# Patient Record
Sex: FEMALE | Race: BLACK OR AFRICAN AMERICAN | Employment: UNEMPLOYED | URBAN - METROPOLITAN AREA
[De-identification: names, ages, dates, MRNs, and addresses within clinical notes are randomized per-mention and may not be internally consistent; named-entity substitution may affect disease eponyms.]

---

## 2019-04-21 NOTE — PROGRESS NOTES
Subjective: Sandy Lebron is a 27 y.o. female who presents today to become established. She is transferring from her pediatrician. No new concerns today. 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 Prior PCP - Dr. Vázquez. Day. pediatrician Active Medical Problems: 
- women's clinic - [address] - hormone implant. Nexplanon.    
-hx of von willibrands disorder - mother states that she was diagnosed as a child. This is part of the reason why she is on the birth control to decrease her menstrual blood loss. Health Care Maintenance 
-pap test - has never had one 
-immunizations - have requested her immunization record from her pediatrician. Patient Active Problem List  
Diagnosis Code  Von Willebrand disease ([facility] D68.0 Current Outpatient Medications Medication Sig Dispense Refill  ferrous sulfate 325 mg (65 mg iron) tablet Take  by mouth Daily (before breakfast). Review of Systems A comprehensive review of systems was negative except for that written in the HPI. Objective:  
 
Visit Vitals /76 (BP 1 Location: Left arm, BP Patient Position: Sitting) Pulse 89 Temp 98.8 °F (37.1 °C) (Oral) Resp 18 Ht 5' 5\" (1.651 m) Wt 192 lb 6.4 oz (87.3 kg) SpO2 99% BMI 32.02 kg/m² General appearance: alert, cooperative, no distress, appears stated age Head: Normocephalic, without obvious abnormality, atraumatic Neck: supple, symmetrical, trachea midline, no adenopathy, no carotid bruit and no JVD Lungs: clear to auscultation bilaterally Heart: regular rate and rhythm, S1, S2 normal, no murmur, click, rub or gallop Abdomen: soft, non-tender. Bowel sounds normal. No masses,  no organomegaly Extremities: extremities normal, atraumatic, no cyanosis or edema Pulses: 2+ and symmetric Assessment/Plan: ICD-10-CM ICD-9-CM 1. Encounter to establish care with new doctor Z76.89 V65.8 2. Von Willebrand disease ([facility] [address] D68.0 286.4 Plan: 
-Medical release forms have been signed for her medical records from her pediatrician. Follow-up Disposition:  
 
Follow up yearly Return if symptoms worsen or fail to improve. Advised patient to call back or return to office if symptoms worsen/change/persist.  
 
Discussed expected course/resolution/complications of diagnosis in detail with patient. Medication risks/benefits/costs/interactions/alternatives discussed with patient. Patient was given an after visit summary which includes diagnoses, current medications, & vitals. Patient expressed understanding with the diagnosis and plan.

## 2019-04-23 ENCOUNTER — OFFICE VISIT (OUTPATIENT)
Dept: INTERNAL MEDICINE CLINIC | Age: 24
End: 2019-04-23

## 2019-04-23 VITALS
OXYGEN SATURATION: 99 % | SYSTOLIC BLOOD PRESSURE: 100 MMHG | HEART RATE: 89 BPM | DIASTOLIC BLOOD PRESSURE: 76 MMHG | HEIGHT: 65 IN | RESPIRATION RATE: 18 BRPM | BODY MASS INDEX: 32.06 KG/M2 | TEMPERATURE: 98.8 F | WEIGHT: 192.4 LBS

## 2019-04-23 DIAGNOSIS — Z76.89 ENCOUNTER TO ESTABLISH CARE WITH NEW DOCTOR: Primary | ICD-10-CM

## 2019-04-23 DIAGNOSIS — D68.00 VON WILLEBRAND DISEASE: ICD-10-CM

## 2019-04-23 RX ORDER — LANOLIN ALCOHOL/MO/W.PET/CERES
CREAM (GRAM) TOPICAL
COMMUNITY
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
## 2019-04-29 ENCOUNTER — DOCUMENTATION ONLY (OUTPATIENT)
Dept: INTERNAL MEDICINE CLINIC | Age: 24
End: 2019-04-29

## 2019-04-29 NOTE — PROGRESS NOTES
Medical records requested from Dr. Marquez [facility] whom patient reported as her prior provider. Received note back from Dr. Beau Sharma office that she was not their patient.

## 2019-04-30 ENCOUNTER — TELEPHONE (OUTPATIENT)
Dept: INTERNAL MEDICINE CLINIC | Age: 24
End: 2019-04-30

## 2019-04-30 NOTE — TELEPHONE ENCOUNTER
Received a incoming fax from Dr. Damian Morillo and Edilma Aponte stating that patient is not a patient at their office. Called patient to inquire regarding her former primary care doctor/pediatrician. Left voicemail for patient to call and verify her last PCP. Spoke with Yessica at Dr. Damian Aponte' office who states that Dr. Forrest Barahona has retired and no longer practices there. Tried to verify if patient was a previous patient and they are unable to bring up patient due to using a new system. Yessica states that if patient has not been since in 3-5 years records are in storage. She states there is a fee that patient has to pay to get records out of storage. Please verify former PCP/pediatrcian with patient and inform of fee for obtaining records that are in storage at their office.

## 2020-08-04 ENCOUNTER — APPOINTMENT (OUTPATIENT)
Dept: CT IMAGING | Age: 25
End: 2020-08-04
Attending: STUDENT IN AN ORGANIZED HEALTH CARE EDUCATION/TRAINING PROGRAM

## 2020-08-04 ENCOUNTER — HOSPITAL ENCOUNTER (EMERGENCY)
Age: 25
Discharge: HOME | End: 2020-08-04
Attending: STUDENT IN AN ORGANIZED HEALTH CARE EDUCATION/TRAINING PROGRAM

## 2020-08-04 VITALS
HEART RATE: 95 BPM | DIASTOLIC BLOOD PRESSURE: 77 MMHG | TEMPERATURE: 98.8 F | RESPIRATION RATE: 18 BRPM | SYSTOLIC BLOOD PRESSURE: 147 MMHG | BODY MASS INDEX: 34.23 KG/M2 | WEIGHT: 205.69 LBS | OXYGEN SATURATION: 99 %

## 2020-08-04 DIAGNOSIS — H74.8X2 HEMOTYMPANUM, LEFT: ICD-10-CM
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
## 2020-08-04 LAB — HCG UR QL: NEGATIVE

## 2020-08-04 PROCEDURE — 99284 EMERGENCY DEPT VISIT MOD MDM: CPT

## 2020-08-04 PROCEDURE — 72125 CT NECK SPINE W/O DYE: CPT

## 2020-08-04 PROCEDURE — 70450 CT HEAD/BRAIN W/O DYE: CPT

## 2020-08-04 PROCEDURE — 81025 URINE PREGNANCY TEST: CPT

## 2020-08-04 PROCEDURE — 99283 EMERGENCY DEPT VISIT LOW MDM: CPT
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
## 2020-08-05 NOTE — ED NOTES
Pt given gingerale, cotton balls for ear, and DC instructions. I have reviewed discharge instructions with the patient. The patient verbalized understanding.
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
## 2020-10-01 ENCOUNTER — HOSPITAL ENCOUNTER (EMERGENCY)
Age: 25
Discharge: HOME | End: 2020-10-01
Attending: EMERGENCY MEDICINE | Admitting: EMERGENCY MEDICINE
Payer: COMMERCIAL

## 2020-10-01 VITALS
WEIGHT: 209 LBS | TEMPERATURE: 98.4 F | RESPIRATION RATE: 16 BRPM | OXYGEN SATURATION: 100 % | SYSTOLIC BLOOD PRESSURE: 135 MMHG | DIASTOLIC BLOOD PRESSURE: 90 MMHG | BODY MASS INDEX: 34.78 KG/M2 | HEART RATE: 93 BPM

## 2020-10-01 DIAGNOSIS — L73.2 HIDRADENITIS SUPPURATIVA: Primary | ICD-10-CM

## 2020-10-01 PROCEDURE — 99282 EMERGENCY DEPT VISIT SF MDM: CPT

## 2020-10-01 RX ORDER — DOXYCYCLINE HYCLATE 100 MG
100 TABLET ORAL 2 TIMES DAILY
Qty: 14 TAB | Refills: 0 | Status: SHIPPED | OUTPATIENT
Start: 2020-10-01 | End: 2020-10-08

## 2020-10-01 NOTE — ED NOTES
Pt discharged home with parent/guardian. Pt acting age appropriately, respirations regular and unlabored, cap refill less than two seconds. Skin pink, dry and warm. Lungs clear bilaterally. No further complaints at this time. Parent/guardian verbalized understanding of discharge paperwork and has no further questions at this time. Education provided about continuation of care, follow up care with specialist and primary care  and medication administration. Parent/guardian able to provided teach back about discharge instructions.

## 2020-10-01 NOTE — DISCHARGE INSTRUCTIONS
Patient Education        Hidradenitis Suppurativa: Care Instructions  Your Care Instructions     Hidradenitis suppurativa (say \"zww-syzx-pg-NY-tus sup-mae-uh-TY-vuh\") is a skin condition that causes lumps on the skin that look like pimples or boils. The lumps are usually painful and can break open and drain blood and bad-smelling pus. The condition can come and go for many years. Treatment for this condition may include antibiotics and other medicines. You may need surgery to remove the lumps. Home care includes wearing loose-fitting clothes and washing the area gently. You can help prevent lumps from coming back by staying at a healthy weight and not smoking. Doctors don't know exactly how this condition starts. But they do know that something irritates and inflames the hair follicles, causing them to swell and form lumps. This skin condition can't be spread from person to person (isn't contagious). Follow-up care is a key part of your treatment and safety. Be sure to make and go to all appointments, and call your doctor if you are having problems. It's also a good idea to know your test results and keep a list of the medicines you take. How can you care for yourself at home? Skin care    · Wash the area every day with mild soap. Use your hands rather than a washcloth or sponge when you wash that part of your body.     · Leave the affected areas uncovered when you can. If you have lumps that are draining, you can cover them with a bandage or other dressing. Put petroleum jelly (such as Vaseline) on the dressing to help keep it from sticking.     · Wear-loose fitting clothes that don't rub against the area. Avoid activities that cause skin to rub together.     · If you have pain, try a warm compress. Soak a towel or washcloth in warm water, wring it out, and place it on the affected skin for about 10 minutes. Medicines    · Be safe with medicines. Take your medicines exactly as prescribed.  Call your doctor if you think you are having a problem with your medicine. You will get more details on the specific medicines your doctor prescribes.     · If your doctor prescribed antibiotics, take them as directed. Do not stop taking them just because you feel better. You need to take the full course of antibiotics. Lifestyle choices    · If you smoke, think about quitting. Smoking can make the condition worse. If you need help quitting, talk to your doctor about stop-smoking programs and medicines. These can increase your chances of quitting for good.     · Stay at a healthy weight, or lose weight, by eating healthy foods and being physically active. Being overweight could make this condition worse. When should you call for help? Call your doctor now or seek immediate medical care if:    · You have symptoms of infection, such as:  ? Increased pain, swelling, warmth, or redness. ? Red streaks leading from the area. ? Pus draining from the area. ? A fever. Watch closely for changes in your health, and be sure to contact your doctor if:    · You do not get better as expected. Where can you learn more? Go to http://www.gray.com/  Enter Q987 in the search box to learn more about \"Hidradenitis Suppurativa: Care Instructions. \"  Current as of: July 2, 2020               Content Version: 12.6  © 2006-2020 Healthwise, Inc[employer]. Care instructions adapted under license by [employer] (which disclaims liability or warranty for this information). If you have questions about a medical condition or this instruction, always ask your healthcare professional. [place] [address] any warranty or liability for your use of this information. Patient Education        Learning About Hidradenitis Suppurativa  What is hidradenitis suppurativa? Hidradenitis suppurativa (say \"siy-eudn-ui-NY-tus sup-yur-uh-TY-vuh\") is a skin condition that causes lumps on the skin.  The lumps look like pimples or boils. The condition can come and go for many years. Doctors don't know exactly how this condition starts. But they do know that something irritates and inflames the hair follicles, causing them to swell and form lumps. This skin condition can't be spread from person to person (isn't contagious). What are the symptoms? Red lumps that may look like pimples, acne, or boils appear on the skin and are usually painful. The lumps:  · Usually occur in areas where skin rubs against skin, such as in the armpit. They can also appear under the breasts, in the groin area, on the buttocks, around the anus, and on the inner thighs. · May go away on their own in a few weeks. But they often come back in the same area. · Can become infected and break open, draining blood and pus that usually smells bad. If the condition isn't treated and gets worse, hollow tunnels can form under the skin. Over time, the infection and tunnels will heal, but a thick scar may form. These scars can keep skin from stretching naturally. How is hidradenitis suppurativa treated? The treatment depends on how serious the condition is. Your doctor may discuss:  · Medicines. You may need to take pills, such as antibiotics, or rub a prescription ointment or cream on the affected skin. · Corticosteroid injections (shots). You may get these shots into the affected areas. · Hormone pills. Some women are helped by taking birth control pills or other medicines that affect their hormones. · Removing infected tissue. Home treatment  Home treatment may provide pain relief and help prevent nodules from coming back. Home treatment includes:  · Wearing loose-fitting clothes. · Washing the area gently with mild soap. · Staying at a healthy weight. If you are overweight, losing weight may help the condition. · Quitting smoking, if you smoke. Follow-up care is a key part of your treatment and safety.  Be sure to make and go to all appointments, and call your doctor if you are having problems. It's also a good idea to know your test results and keep a list of the medicines you take. Where can you learn more? Go to http://www.gray.com/  Enter J430 in the search box to learn more about \"Learning About Hidradenitis Suppurativa. \"  Current as of: July 2, 2020               Content Version: 12.6  © 2006-2020 [employer]. Care instructions adapted under license by [employer] (which disclaims liability or warranty for this information). If you have questions about a medical condition or this instruction, always ask your healthcare professional. [address] any warranty or liability for your use of this information.

## 2020-10-01 NOTE — ED PROVIDER NOTES
53-year-old female with past medical history of hidradenitis suppurativa and von Willebrand disease presents to ED due to lumps that are painful and swollen underneath her breasts x2 to 3 days. Patient states that she has had these lumps from at [facility] of her armpits which come and go occasionally for many years. She has not had similar lumps under her breasts before and was concerned when they started to drain fluid and a little bit of blood. Denies any drainage of fluid or blood from her nipples or swelling of her breast other than at the site of lesion. Denies fever, chills, nausea, appetite change. Patient notes that she showers once to twice daily and always keeps her skin dry and clean as that previously has helped decrease her at HS flares. Patient has taken courses of antibiotics to help with flares in the past, has never had any ongoing treatments. Has a Nexplanon implant which is up-to-date. Past Medical History:   Diagnosis Date    Axillary hidradenitis suppurativa     HX OTHER MEDICAL     anemia    Elvi Ana disease        History reviewed. No pertinent surgical history. Family History:   Problem Relation Age of Onset    Hypertension Mother     Heart Disease Mother        Social History     Socioeconomic History    Marital status: SINGLE     Spouse name: Not on file    Number of children: Not on file    Years of education: Not on file    Highest education level: Not on file   Occupational History    Not on file   Social Needs    Financial resource strain: Not on file    Food insecurity     Worry: Not on file     Inability: Not on file    Transportation needs     Medical: Not on file     Non-medical: Not on file   Tobacco Use    Smoking status: Never Smoker    Smokeless tobacco: Never Used   Substance and Sexual Activity    Alcohol use: Yes     Alcohol/week: 1.0 standard drinks     Types: 1 Cans of beer per week     Frequency: Never     Comment:  Occ    Drug use: Never  Sexual activity: Never   Lifestyle    Physical activity     Days per week: Not on file     Minutes per session: Not on file    Stress: Not on file   Relationships    Social connections     Talks on phone: Not on file     Gets together: Not on file     Attends Protestant service: Not on file     Active member of club or organization: Not on file     Attends meetings of clubs or organizations: Not on file     Relationship status: Not on file    Intimate partner violence     Fear of current or ex partner: Not on file     Emotionally abused: Not on file     Physically abused: Not on file     Forced sexual activity: Not on file   Other Topics Concern    Not on file   Social History Narrative    Not on file         ALLERGIES: Peanut and Shellfish derived    Review of Systems   Constitutional: Negative for appetite change, chills and fever. HENT: Negative for congestion and sore throat. Respiratory: Negative for cough and shortness of breath. Cardiovascular: Negative for chest pain. Gastrointestinal: Negative for nausea and vomiting. Genitourinary: Negative for dysuria. Musculoskeletal: Negative for myalgias. Skin: Positive for wound (swollen tender lumps under breasts that drain ). Negative for rash. Neurological: Negative for dizziness and weakness. Vitals:    10/01/20 1220   BP: (!) 135/90   Pulse: 93   Resp: 16   Temp: 98.4 °F (36.9 °C)   SpO2: 100%   Weight: 94.8 kg (208 lb 15.9 oz)            Physical Exam  Vitals signs and nursing note reviewed. Constitutional:       General: She is not in acute distress. HENT:      Head: Normocephalic. Nose: Nose normal.      Mouth/Throat:      Mouth: Mucous membranes are moist.   Eyes:      Extraocular Movements: Extraocular movements intact. Neck:      Musculoskeletal: Normal range of motion. Pulmonary:      Effort: Pulmonary effort is normal. No respiratory distress.    Chest:      Breasts:         Right: Inverted nipple (baseline) present. No nipple discharge. Left: Inverted nipple (baseline) present. No nipple discharge. Comments: Multiple tender and swollen nodular lesions in bilateral breast folds and axillae, significant scarring bilateral axillae consistent with HS, scant purulent drainage from 3 lesions of breast folds  Lymphadenopathy:      Upper Body:      Right upper body: No supraclavicular adenopathy. Left upper body: No supraclavicular adenopathy. Skin:     General: Skin is dry. Findings: No rash. Neurological:      Mental Status: She is alert and oriented to person, place, and time.    Psychiatric:         Mood and Affect: Mood normal.          MDM  Number of Diagnoses or Management Options  Hidradenitis suppurativa:     Differential diagnosis includes hidradenitis suppurativa, cellulitis, fibroadenoma of breast, cystic breast tissue, malignancy of breast    Lesions of axillary and breast folds consistent with hidradenitis suppurativa remaining breast tissue is normal, no drainage from nipples, no surrounding cellulitis    Patient appears well, minimal pain from lesions, mostly concerned about new onset of breast fold lesions and drainage, started on 7-day course of doxycycline and referred to dermatology and primary care for further evaluation and treatment, return precautions reviewed    Reviewed with attending physician, Dr. Tiffanie Wilburn PA-C  10/1/2020

## 2020-10-01 NOTE — ED TRIAGE NOTES
Triage: pt has hx of skine lesion/abscess to axilla regions. Pt states both breast have been swelling.   Pt has multiple small open skin lesions under breast area, no drainage noted no swelling or redness noted

## 2021-07-19 ENCOUNTER — APPOINTMENT (OUTPATIENT)
Dept: GENERAL RADIOLOGY | Age: 26
End: 2021-07-19
Attending: EMERGENCY MEDICINE
Payer: COMMERCIAL

## 2021-07-19 ENCOUNTER — HOSPITAL ENCOUNTER (EMERGENCY)
Age: 26
Discharge: HOME | End: 2021-07-19
Attending: EMERGENCY MEDICINE
Payer: COMMERCIAL

## 2021-07-19 VITALS
RESPIRATION RATE: 18 BRPM | OXYGEN SATURATION: 97 % | HEART RATE: 87 BPM | BODY MASS INDEX: 31.62 KG/M2 | SYSTOLIC BLOOD PRESSURE: 155 MMHG | WEIGHT: 190.04 LBS | TEMPERATURE: 97.8 F | DIASTOLIC BLOOD PRESSURE: 90 MMHG

## 2021-07-19 DIAGNOSIS — M65.9 TENOSYNOVITIS OF LEFT WRIST: Primary | ICD-10-CM

## 2021-07-19 LAB
ALBUMIN SERPL-MCNC: 3.8 G/DL (ref 3.5–5)
ALBUMIN/GLOB SERPL: 0.8 {RATIO} (ref 1.1–2.2)
ALP SERPL-CCNC: 111 U/L (ref 45–117)
ALT SERPL-CCNC: 12 U/L (ref 12–78)
ANION GAP SERPL CALC-SCNC: 4 MMOL/L (ref 5–15)
AST SERPL-CCNC: 9 U/L (ref 15–37)
BASOPHILS # BLD: 0.1 K/UL (ref 0–0.1)
BASOPHILS NFR BLD: 1 % (ref 0–1)
BILIRUB SERPL-MCNC: 0.7 MG/DL (ref 0.2–1)
BUN SERPL-MCNC: 5 MG/DL (ref 6–20)
BUN/CREAT SERPL: 8 (ref 12–20)
CALCIUM SERPL-MCNC: 9.5 MG/DL (ref 8.5–10.1)
CHLORIDE SERPL-SCNC: 106 MMOL/L (ref 97–108)
CO2 SERPL-SCNC: 25 MMOL/L (ref 21–32)
CREAT SERPL-MCNC: 0.62 MG/DL (ref 0.55–1.02)
CRP SERPL-MCNC: 1.11 MG/DL (ref 0–0.6)
DIFFERENTIAL METHOD BLD: ABNORMAL
EOSINOPHIL # BLD: 0.1 K/UL (ref 0–0.4)
EOSINOPHIL NFR BLD: 2 % (ref 0–7)
ERYTHROCYTE [DISTWIDTH] IN BLOOD BY AUTOMATED COUNT: 13.2 % (ref 11.5–14.5)
ERYTHROCYTE [SEDIMENTATION RATE] IN BLOOD: 15 MM/HR (ref 0–20)
GLOBULIN SER CALC-MCNC: 4.7 G/DL (ref 2–4)
GLUCOSE SERPL-MCNC: 88 MG/DL (ref 65–100)
HCT VFR BLD AUTO: 41.1 % (ref 35–47)
HGB BLD-MCNC: 13.4 G/DL (ref 11.5–16)
IMM GRANULOCYTES # BLD AUTO: 0 K/UL (ref 0–0.04)
IMM GRANULOCYTES NFR BLD AUTO: 1 % (ref 0–0.5)
LYMPHOCYTES # BLD: 2.5 K/UL (ref 0.8–3.5)
LYMPHOCYTES NFR BLD: 31 % (ref 12–49)
MCH RBC QN AUTO: 32.9 PG (ref 26–34)
MCHC RBC AUTO-ENTMCNC: 32.6 G/DL (ref 30–36.5)
MCV RBC AUTO: 101 FL (ref 80–99)
MONOCYTES # BLD: 0.6 K/UL (ref 0–1)
MONOCYTES NFR BLD: 7 % (ref 5–13)
NEUTS SEG # BLD: 4.8 K/UL (ref 1.8–8)
NEUTS SEG NFR BLD: 58 % (ref 32–75)
NRBC # BLD: 0 K/UL (ref 0–0.01)
NRBC BLD-RTO: 0 PER 100 WBC
PLATELET # BLD AUTO: 345 K/UL (ref 150–400)
PMV BLD AUTO: 8.6 FL (ref 8.9–12.9)
POTASSIUM SERPL-SCNC: 3.4 MMOL/L (ref 3.5–5.1)
PROT SERPL-MCNC: 8.5 G/DL (ref 6.4–8.2)
RBC # BLD AUTO: 4.07 M/UL (ref 3.8–5.2)
SODIUM SERPL-SCNC: 135 MMOL/L (ref 136–145)
WBC # BLD AUTO: 8.2 K/UL (ref 3.6–11)

## 2021-07-19 PROCEDURE — 36415 COLL VENOUS BLD VENIPUNCTURE: CPT

## 2021-07-19 PROCEDURE — 99283 EMERGENCY DEPT VISIT LOW MDM: CPT

## 2021-07-19 PROCEDURE — 80053 COMPREHEN METABOLIC PANEL: CPT

## 2021-07-19 PROCEDURE — 86140 C-REACTIVE PROTEIN: CPT

## 2021-07-19 PROCEDURE — 85025 COMPLETE CBC W/AUTO DIFF WBC: CPT

## 2021-07-19 PROCEDURE — 85652 RBC SED RATE AUTOMATED: CPT

## 2021-07-19 PROCEDURE — 73110 X-RAY EXAM OF WRIST: CPT

## 2021-07-19 RX ORDER — KETOROLAC TROMETHAMINE 10 MG/1
10 TABLET, FILM COATED ORAL
Qty: 14 TABLET | Refills: 0 | Status: SHIPPED | OUTPATIENT
Start: 2021-07-19

## 2021-07-19 RX ORDER — METHYLPREDNISOLONE 4 MG/1
TABLET ORAL
Qty: 1 DOSE PACK | Refills: 0 | Status: SHIPPED | OUTPATIENT
Start: 2021-07-19

## 2021-07-19 NOTE — Clinical Note
[address]  [address] [facility] [id number]  [phone]    Work/School Note    Date: 7/19/2021    To Whom It May concern:    Rios Martinez was seen and treated today in the emergency room by the following provider(s):  Attending Provider: Trung Aguila MD.      Rios Martinez is excused from work/school on 07/19/21 and 07/20/21. She is medically clear to return to work/school on 7/21/2021.        Sincerely,          Lucas Simon MD

## 2021-07-19 NOTE — DISCHARGE INSTRUCTIONS
Use the medications as directed for pain and inflammation. Use the splint for the next 7 days. Keep the hand elevated and follow up with own MD if continued difficulty.

## 2021-07-19 NOTE — ED TRIAGE NOTES
Triage Note: Patient reports waking up yesterday with left wrist and hand pain. Denies injury. Patient has been using ice with no relief.

## 2021-10-01 ENCOUNTER — TELEPHONE (OUTPATIENT)
Dept: SURGERY | Age: 26
End: 2021-10-01

## 2021-10-04 ENCOUNTER — OFFICE VISIT (OUTPATIENT)
Dept: SURGERY | Age: 26
End: 2021-10-04

## 2021-10-04 VITALS
WEIGHT: 189 LBS | HEART RATE: 67 BPM | DIASTOLIC BLOOD PRESSURE: 77 MMHG | TEMPERATURE: 98.4 F | SYSTOLIC BLOOD PRESSURE: 133 MMHG | HEIGHT: 65 IN | OXYGEN SATURATION: 97 % | RESPIRATION RATE: 17 BRPM | BODY MASS INDEX: 31.49 KG/M2

## 2021-10-04 DIAGNOSIS — Z97.5 NEXPLANON IN PLACE: Primary | ICD-10-CM

## 2021-10-04 PROCEDURE — 11982 REMOVE DRUG IMPLANT DEVICE: CPT | Performed by: SURGERY

## 2021-10-04 PROCEDURE — 99202 OFFICE O/P NEW SF 15 MIN: CPT | Performed by: SURGERY

## 2021-10-04 NOTE — PROGRESS NOTES
Identified pt with two pt identifiers(name and ). Reviewed record in preparation for visit and have obtained necessary documentation. Chief Complaint   Patient presents with    New Patient     nexplanon removal       Vitals:    10/04/21 1450   BP: 133/77   Pulse: 67   Resp: 17   Temp: 98.4 °F (36.9 °C)   TempSrc: Oral   SpO2: 97%   Weight: 85.7 kg (189 lb)   Height: 5' 5\" (1.651 m)   PainSc:   0 - No pain       Health Maintenance Review: Patient reminded of \"due or due soon\" health maintenance. I have asked the patient to contact his/her primary care provider (PCP) for follow-up on his/her health maintenance. Coordination of care    1) Have you been to an emergency room, urgent care, or hospitalized since your last visit? If yes, where when, and reason for visit? yes and ED 2021, dr. Francine Campbell, for wrist pain, notable blood pressure elevation during ED visit. 2. Have seen or consulted any other health care provider since your last visit? If yes, where when, and reason for visit? NO      Patient is accompanied by self I have received verbal consent from Feliz Price to discuss any/all medical information while they are present in the room.

## 2021-10-04 NOTE — Clinical Note
10/7/2021    Patient: Willis Ceballos   YOB: 1995   Date of Visit: 10/4/2021     Rissa Phillips MD  [address] [place] [address] Dr RODOLFO Matthews [phone]  [address] [phone]  Via Fax: [phone]    Dear MD Paris Santos MD,      Thank you for referring Ms. Gladys Wood to  [facility] for evaluation. My notes for this consultation are attached. If you have questions, please do not hesitate to call me. I look forward to following your patient along with you.       Sincerely,    Priyanka Goff MD

## 2021-10-07 NOTE — PROGRESS NOTES
To: MD Juwan Turcios MD       From: [facility] MD Leodan    Thank you for sending Geri Branham. Encounter Date: 10/4/2021    Subjective: Pato Somers is a 27 y.o. female presents for possible removal of left upper arm contraceptive Depo device. It seems to have migrated away from the insertion site. Objective:     Visit Vitals  /77 (BP 1 Location: Right arm, BP Patient Position: Sitting, BP Cuff Size: Adult)   Pulse 67   Temp 98.4 °F (36.9 °C) (Oral)   Resp 17   Ht 5' 5\" (1.651 m)   Wt 85.7 kg (189 lb)   SpO2 97%   BMI 31.45 kg/m²       General:  alert, cooperative, no distress, appears stated age   Left upper arm  scar over the medial aspect. Further proximal to this there is a device in the subcutaneous tissue visible on ultrasound. Assessment:     Retained left upper arm contraceptive Depo device. Plan:     She would like it removed. Procedure Note    Procedure Date: 10/4/2021    Pre-operative diagnosis: Above  Post-operative diagnosis: Above  Procedure: Removal of subcutaneous contraceptive Depo device     Time out at performed by me (see consent form):  * Patient was identified by name and date of birth   * Agreement on procedure being performed was verified  * Risks and Benefits explained to the patient  * Procedure site verified and marked as necessary  * Patient was positioned for comfort  * Consent was signed and verified    Anesthesia: Local    Procedure Details: The device was localized with ultrasound and then the area was prepped and draped in the usual manner. Local anesthesia was infiltrated into the skin and soft tissues surrounding the lesion. An incision was made. This was taken down into subcutaneous tissues with blunt and sharp dissection. The device was encountered, grasped, dissected out and removed. The incision was closed with a 3-0 Vicryl. Glue dressing was then applied.      Estimated Blood Loss:  minimal    Disposition: Procedure well tolerated. Post-procedure pain scale: 0/10.     Signed By: Priyanka Goff MD

## 2022-03-20 PROBLEM — D68.00 VON WILLEBRAND DISEASE (HCC): Status: ACTIVE | Noted: 2019-04-23

## 2022-09-08 NOTE — ED PROVIDER NOTES
Chief Complaint     Abnormal Lab    History of Present Illness     Alyson Frank is a 53 y.o. female who presents to [facility] GASTRO[facility]OLOG[facility] on referral from ALICIA Yeung for a gastroenterology evaluation of elevated alk phos.        She reports liver enzyme elevation since April.  In reviewing previous labs it appears that alk phos has been elevated since 5/2021.    Denies family history of liver disease.  Denies abdominal pain.  Reports having only an occasional alcoholic drink.      Reports that she retired in 2020 and lost about twenty pounds.  She's gained about five pounds back since that time.       History      Past Medical History:   Diagnosis Date   • Allergic     Seasonal   • Arthritis    • Encounter for screening colonoscopy    • Seasonal allergies        Past Surgical History:   Procedure Laterality Date   • BUNIONECTOMY Bilateral    • COLONOSCOPY      NEVER HAD COLONSCOPY IN THE PAST (FIRST COLONOSCOPY )   • COLONOSCOPY N/A 12/13/2021    Procedure: COLONOSCOPY;  Surgeon: Adelita De La Torre MD;  Location: [facility] ENDOSCOPY;  Service: Gastroenterology;  Laterality: N/A;  DIVERTICULOSIS, HEMORRHOIDS   • CYST REMOVAL Right     RIGHT RING FINGER (FOURTH DIGIT)   • OTHER SURGICAL HISTORY      JOINT SURGERY (SCREW PLACED) RADHA FEET       Family History   Problem Relation Age of Onset   • Heart disease Mother         Afib   • Osteoporosis Mother    • Heart disease Father         heart attack at age 48- he was a smoker   • Colon cancer Neg Hx         Current Medications        Current Outpatient Medications:   •  vitamin C (ASCORBIC ACID) 250 MG tablet, Take 250 mg by mouth Daily., Disp: , Rfl:   •  vitamin D3 125 MCG (5000 UT) capsule capsule, Take 5,000 Units by mouth Daily., Disp: , Rfl:   •  Zinc 50 MG capsule, Take 50 mg by mouth Daily., Disp: , Rfl:      Allergies     No Known Allergies    Social History       Social History     Social History Narrative   • Not on  This is a 71-year-old who presents to the ED complaining only of pain in her left wrist and the fingers of her left hand. She says that with any type of movement of the wrist or the fingers she develops pain up towards the wrist.  There is been no injury. She had no fever or chills and has had no illnesses, new medications or other acute symptomatology. She denies any repetitive motions with the wrist or anything unusual yesterday. She has no other problems with any other joints. Her problem is specifically allocated to her left wrist and hand. She has never had anything like this before. Her mother is hypertensive and and asked her to come to the ED and felt that she possibly was having a stroke and wanted her blood pressure checked. Evaluation by the nurse the blood pressure was noted to be 696 systolic. Patient is otherwise asymptomatic. She has never had any hypertension and is on no medications again for anything including hypertension. Past Medical History:   Diagnosis Date    Axillary hidradenitis suppurativa     HX OTHER MEDICAL     anemia    Kirke Micah disease     Von Willebrand disease ([facility] [address]        History reviewed. No pertinent surgical history. Family History:   Problem Relation Age of Onset    Hypertension Mother     Heart Disease Mother        Social History     Socioeconomic History    Marital status: SINGLE     Spouse name: Not on file    Number of children: Not on file    Years of education: Not on file    Highest education level: Not on file   Occupational History    Not on file   Tobacco Use    Smoking status: Never Smoker    Smokeless tobacco: Never Used   Substance and Sexual Activity    Alcohol use: Yes     Alcohol/week: 1.0 standard drinks     Types: 1 Cans of beer per week     Comment:  Occ    Drug use: Never    Sexual activity: Never   Other Topics Concern    Not on file   Social History Narrative    Not on file     Social Determinants of Health "file       Immunizations     Immunization:  Immunization History   Administered Date(s) Administered   • COVID-19 (MODERNA) 1st, 2nd, 3rd Dose Only 02/03/2021, 03/03/2021, 11/12/2021   • Flu Vaccine Quad PF >36MO 10/22/2019, 10/20/2021   • Hepatitis A 03/06/2019, 09/27/2019   • Influenza, Unspecified 12/01/2020   • Tdap 07/18/2022          Objective     Objective     Vital Signs:   /91 (BP Location: Left arm, Patient Position: Sitting, Cuff Size: Adult)   Pulse 83   Ht 162.6 cm (64\")   Wt 93 kg (205 lb)   BMI 35.19 kg/m²       Physical Exam  Constitutional:       General: She is not in acute distress.     Appearance: Normal appearance. She is well-developed and normal weight.   HENT:      Head: Normocephalic and atraumatic.   Eyes:      Conjunctiva/sclera: Conjunctivae normal.      Pupils: Pupils are equal, round, and reactive to light.      Visual Fields: Right eye visual fields normal and left eye visual fields normal.   Cardiovascular:      Rate and Rhythm: Normal rate and regular rhythm.      Heart sounds: Normal heart sounds.   Pulmonary:      Effort: Pulmonary effort is normal. No retractions.      Breath sounds: Normal breath sounds and air entry.   Abdominal:      General: Bowel sounds are normal.      Palpations: Abdomen is soft.      Tenderness: There is no abdominal tenderness.      Comments: No appreciable hepatosplenomegaly or ascites   Musculoskeletal:         General: Normal range of motion.      Cervical back: Neck supple.      Right lower leg: No edema.      Left lower leg: No edema.   Lymphadenopathy:      Cervical: No cervical adenopathy.   Skin:     General: Skin is warm and dry.      Findings: No lesion.   Neurological:      General: No focal deficit present.      Mental Status: She is alert and oriented to person, place, and time.   Psychiatric:         Mood and Affect: Mood and affect normal.         Behavior: Behavior normal.         Results      Result Review :   The following data " Financial Resource Strain:     Difficulty of Paying Living Expenses:    Food Insecurity:     Worried About Running Out of Food in the Last Year:     [address] in the Last Year:    Transportation Needs:     Lack of Transportation (Medical):  Lack of Transportation (Non-Medical):    Physical Activity:     Days of Exercise per Week:     Minutes of Exercise per Session:    Stress:     Feeling of Stress :    Social Connections:     Frequency of Communication with Friends and Family:     Frequency of Social Gatherings with Friends and Family:     Attends Zoroastrianism Services:     Active Member of Clubs or Organizations:     Attends Club or Organization Meetings:     Marital Status:    Intimate Partner Violence:     Fear of Current or Ex-Partner:     Emotionally Abused:     Physically Abused:     Sexually Abused: ALLERGIES: Peanut and Shellfish derived    Review of Systems   Constitutional: Negative for activity change, appetite change, chills, fatigue and fever. HENT: Negative for ear pain, facial swelling, sore throat and trouble swallowing. Eyes: Negative for pain, discharge and visual disturbance. Respiratory: Negative for chest tightness, shortness of breath and wheezing. Cardiovascular: Negative for chest pain and palpitations. Gastrointestinal: Negative for abdominal pain, blood in stool, nausea and vomiting. Genitourinary: Negative for difficulty urinating, flank pain and hematuria. Musculoskeletal: Positive for arthralgias. Negative for joint swelling, myalgias and neck pain. The patient's only complaint is pain in the area of the left wrist and over the fingers of the left hand when she tries to move her fingers. Some of that pain is going up towards the forearm distally. No other joint or extremity complaints. No joint swelling   Skin: Negative for color change and rash. Neurological: Negative for dizziness, weakness, numbness and headaches. Hematological: Negative for adenopathy. Does not bruise/bleed easily. Psychiatric/Behavioral: Negative for behavioral problems, confusion and sleep disturbance. All other systems reviewed and are negative. Vitals:    07/19/21 1646 07/19/21 1647   BP: (!) 212/107 (!) 217/104   Pulse: 86    Resp: 16    Temp: 98.4 °F (36.9 °C)    SpO2: 100%    Weight: 86.2 kg (190 lb 0.6 oz)             Physical Exam  Vitals and nursing note reviewed. Constitutional:       General: She is not in acute distress. Appearance: She is well-developed. Comments: This is a 47-year-old female with vital signs as noted. She has no history of hypertension. She is in no acute distress other than with wrist pain. HENT:      Head: Normocephalic and atraumatic. Nose: Nose normal.   Eyes:      General: No scleral icterus. Conjunctiva/sclera: Conjunctivae normal.      Pupils: Pupils are equal, round, and reactive to light. Neck:      Thyroid: No thyromegaly. Vascular: No JVD. Trachea: No tracheal deviation. Comments: No carotid bruits noted. Cardiovascular:      Rate and Rhythm: Normal rate and regular rhythm. Heart sounds: Normal heart sounds. No murmur heard. No friction rub. No gallop. Pulmonary:      Effort: Pulmonary effort is normal. No respiratory distress. Breath sounds: Normal breath sounds. No wheezing or rales. Chest:      Chest wall: No tenderness. Abdominal:      General: Bowel sounds are normal. There is no distension. Palpations: Abdomen is soft. There is no mass. Tenderness: There is no abdominal tenderness. There is no guarding or rebound. Musculoskeletal:         General: Tenderness present. No swelling or signs of injury. Normal range of motion. Cervical back: Normal range of motion and neck supple. Comments: Patient is noted to have pain in the left wrist without significant swelling noted.   There is pain overlying the dorsum and volar was reviewed by: ALICIA Curran on 09/08/2022:      CMP    CMP 7/18/22 7/18/22    0914 0914   Glucose 79    BUN 16    Creatinine 0.82    Sodium 140    Potassium 4.3    Chloride 104    Calcium 10.1    Albumin 4.40    Total Bilirubin 0.3    Alkaline Phosphatase 146 (A) 162 (A)   AST (SGOT) 17    ALT (SGPT) 20    (A) Abnormal value            7/18/2022 GGT-56.  Acute hepatitis panel-nonreactive.         Assessment and Plan        Assessment and Plan    Diagnoses and all orders for this visit:    1. Elevated alkaline phosphatase level (Primary)  -     Alpha - 1 - Antitrypsin  -     SILVINO  -     Anti-Smooth Muscle Antibody Titer  -     Ceruloplasmin  -     Ferritin  -     Immunofixation, Serum  -     Iron Profile  -     Mitochondrial Antibodies, M2  -     US Abdomen Limited  -     Comprehensive Metabolic Panel; Future    2. Elevated serum GGT level          Follow Up        Follow Up   Return in about 4 months (around 1/8/2023) for elevated alk phos.  Patient was given instructions and counseling regarding her condition or for health maintenance advice. Please see specific information pulled into the AVS if appropriate.     surface of the wrist.  There is some discomfort in the distal forearm. Movement of her fingers on the left hand produces some discomfort in the wrist.  No paresthesias noted. No involvement of any other joints or extremity are noted. Lymphadenopathy:      Cervical: No cervical adenopathy. Skin:     General: Skin is warm and dry. Findings: No erythema or rash. Neurological:      Mental Status: She is alert and oriented to person, place, and time. Cranial Nerves: No cranial nerve deficit. Coordination: Coordination normal.      Deep Tendon Reflexes: Reflexes are normal and symmetric. Psychiatric:         Behavior: Behavior normal.         Thought Content: Thought content normal.         Judgment: Judgment normal.          MDM  Number of Diagnoses or Management Options     Amount and/or Complexity of Data Reviewed  Clinical lab tests: ordered and reviewed  Decide to obtain previous medical records or to obtain history from someone other than the patient: yes  Review and summarize past medical records: yes    Risk of Complications, Morbidity, and/or Mortality  Presenting problems: high  Diagnostic procedures: high  Management options: high    Patient Progress  Patient progress: stable         Procedures    We will check the patient's labs and x-rays. If the x-rays are negative, will place her in a padded wrap to help minimize movement of her fingers and wrist.  She will need to be placed on anti-inflammatories. We will also evaluate her renal and liver function with her hypertensive disease. Patient's labs and x-ray appear unremarkable. Suspect that she may have a tendinitis or other inflammatory process. She is placed in a splint and padded wrap as well as a sling. She will be treated with steroids and medications for discomfort and follow-up with orthopedics in the next several days.

## 2023-05-25 RX ORDER — KETOROLAC TROMETHAMINE 10 MG/1
10 TABLET, FILM COATED ORAL EVERY 6 HOURS PRN
COMMUNITY
Start: 2021-07-19

## 2023-05-25 RX ORDER — METHYLPREDNISOLONE 4 MG/1
TABLET ORAL
COMMUNITY
Start: 2021-07-19

## 2023-05-25 RX ORDER — FERROUS SULFATE 325(65) MG
TABLET ORAL
COMMUNITY